# Patient Record
Sex: MALE | Race: OTHER | HISPANIC OR LATINO | URBAN - METROPOLITAN AREA
[De-identification: names, ages, dates, MRNs, and addresses within clinical notes are randomized per-mention and may not be internally consistent; named-entity substitution may affect disease eponyms.]

---

## 2019-11-02 ENCOUNTER — EMERGENCY (EMERGENCY)
Facility: HOSPITAL | Age: 39
LOS: 1 days | Discharge: HOME | End: 2019-11-02
Attending: EMERGENCY MEDICINE
Payer: SELF-PAY

## 2019-11-02 VITALS
WEIGHT: 259.93 LBS | DIASTOLIC BLOOD PRESSURE: 98 MMHG | RESPIRATION RATE: 98 BRPM | SYSTOLIC BLOOD PRESSURE: 172 MMHG | HEIGHT: 66 IN | TEMPERATURE: 98 F | OXYGEN SATURATION: 98 % | HEART RATE: 97 BPM

## 2019-11-02 PROCEDURE — 99284 EMERGENCY DEPT VISIT MOD MDM: CPT

## 2019-11-02 PROCEDURE — 72131 CT LUMBAR SPINE W/O DYE: CPT

## 2019-11-02 PROCEDURE — 99284 EMERGENCY DEPT VISIT MOD MDM: CPT | Mod: 25

## 2019-11-02 PROCEDURE — 96372 THER/PROPH/DIAG INJ SC/IM: CPT

## 2019-11-02 RX ORDER — IBUPROFEN 200 MG
600 TABLET ORAL ONCE
Refills: 0 | Status: COMPLETED | OUTPATIENT
Start: 2019-11-02 | End: 2019-11-02

## 2019-11-02 RX ORDER — LIDOCAINE 4 G/100G
1 CREAM TOPICAL ONCE
Refills: 0 | Status: COMPLETED | OUTPATIENT
Start: 2019-11-02 | End: 2019-11-02

## 2019-11-02 RX ORDER — MORPHINE SULFATE 50 MG/1
4 CAPSULE, EXTENDED RELEASE ORAL ONCE
Refills: 0 | Status: DISCONTINUED | OUTPATIENT
Start: 2019-11-02 | End: 2019-11-02

## 2019-11-02 RX ORDER — METHOCARBAMOL 500 MG/1
1000 TABLET, FILM COATED ORAL ONCE
Refills: 0 | Status: COMPLETED | OUTPATIENT
Start: 2019-11-02 | End: 2019-11-02

## 2019-11-02 RX ORDER — OXYCODONE AND ACETAMINOPHEN 5; 325 MG/1; MG/1
1 TABLET ORAL ONCE
Refills: 0 | Status: DISCONTINUED | OUTPATIENT
Start: 2019-11-02 | End: 2019-11-02

## 2019-11-02 RX ADMIN — METHOCARBAMOL 1000 MILLIGRAM(S): 500 TABLET, FILM COATED ORAL at 19:56

## 2019-11-02 RX ADMIN — MORPHINE SULFATE 4 MILLIGRAM(S): 50 CAPSULE, EXTENDED RELEASE ORAL at 19:56

## 2019-11-02 RX ADMIN — LIDOCAINE 1 PATCH: 4 CREAM TOPICAL at 19:59

## 2019-11-02 RX ADMIN — Medication 600 MILLIGRAM(S): at 19:55

## 2019-11-02 RX ADMIN — OXYCODONE AND ACETAMINOPHEN 1 TABLET(S): 5; 325 TABLET ORAL at 21:22

## 2019-11-02 NOTE — ED STATDOCS - CLINICAL SUMMARY MEDICAL DECISION MAKING FREE TEXT BOX
Pt with back and LLE pain; will give muscle relaxers, antiinflammatories, Lidoderm patch, and morphine for pain; will reeval after X-Ray

## 2019-11-02 NOTE — ED STATDOCS - PATIENT PORTAL LINK FT
You can access the FollowMyHealth Patient Portal offered by [facility] by registering at the following website: [email or web address]. By joining [employer]’s FollowMyHealth portal, you will also be able to view your health information using other applications (apps) compatible with our system.

## 2019-11-02 NOTE — ED STATDOCS - PROGRESS NOTE DETAILS
39 year old male with no PMHx presents to the ED for back pain s/p lifting heavy object. Pt initially did not have pain but then developed pain several hours after the injury. No bowl or bladder incontinence, fever, chills, recent surgeries, h/o CA, weakness of the legs. Pt able to ambulate but with pain. HPI/ ROS/ PEx as stated above. CT reveals chronic changes, no acute findings. CT report printed out and given to pt. Pt to f/u with Spine center and PMD. Pt dced with Robaxin. Return precautions provided.

## 2019-11-02 NOTE — ED STATDOCS - OBJECTIVE STATEMENT
40 y/o M with no significant PMHx presents to the ED c/o back pain s/p an injury at his home. Pt was attempting to lift a heavy object which he estimates to be about 200 lbs when he felt a "pop" in his back. Pt reports that the pain he is experiencing is very severe and radiates into his LLE. Pt states it is difficult to walk due to the pain in his LLE. Pt denies any numbness, tingling, or incontinence of stool or urine.

## 2019-11-02 NOTE — ED STATDOCS - MUSCULOSKELETAL, MLM
No ttp along spinal processes of T or C, positive ttp to sacral spine with no step off or deformities. Extremities have full ROM, are NVI, and no decreased sensation

## 2019-11-02 NOTE — ED ADULT TRIAGE NOTE - CHIEF COMPLAINT QUOTE
Patient arrived to ED today with c/o back pains for the past 4 days after picking up something heavy at work.

## 2019-11-02 NOTE — ED STATDOCS - ATTENDING CONTRIBUTION TO CARE
I, Lilli De Jesus, performed the initial face to face bedside interview with this patient regarding history of present illness, review of symptoms and relevant past medical, social and family history.  I completed an independent physical examination.  I was the initial provider who evaluated this patient. I have signed out the follow up of any pending tests (i.e. labs, radiological studies) to the ACP.  I have communicated the patient’s plan of care and disposition with the ACP.  The history, relevant review of systems, past medical and surgical history, medical decision making, and physical examination was documented by the scribe in my presence and I attest to the accuracy of the documentation.

## 2019-11-03 PROCEDURE — 72131 CT LUMBAR SPINE W/O DYE: CPT | Mod: 26

## 2019-11-03 RX ORDER — METHOCARBAMOL 500 MG/1
1 TABLET, FILM COATED ORAL
Qty: 12 | Refills: 0
Start: 2019-11-03 | End: 2019-11-06

## 2019-11-04 PROBLEM — Z78.9 OTHER SPECIFIED HEALTH STATUS: Chronic | Status: ACTIVE | Noted: 2019-11-03

## 2019-11-04 PROBLEM — Z00.00 ENCOUNTER FOR PREVENTIVE HEALTH EXAMINATION: Status: ACTIVE | Noted: 2019-11-04

## 2019-11-06 ENCOUNTER — APPOINTMENT (OUTPATIENT)
Dept: NEUROSURGERY | Facility: CLINIC | Age: 39
End: 2019-11-06

## 2019-11-06 VITALS
TEMPERATURE: 98 F | HEIGHT: 72 IN | WEIGHT: 270 LBS | DIASTOLIC BLOOD PRESSURE: 85 MMHG | SYSTOLIC BLOOD PRESSURE: 154 MMHG | HEART RATE: 112 BPM | BODY MASS INDEX: 36.57 KG/M2

## 2022-08-25 ENCOUNTER — EMERGENCY (EMERGENCY)
Facility: HOSPITAL | Age: 42
LOS: 1 days | Discharge: HOME | End: 2022-08-25
Attending: EMERGENCY MEDICINE
Payer: COMMERCIAL

## 2022-08-25 VITALS
WEIGHT: 315 LBS | TEMPERATURE: 98 F | HEIGHT: 66 IN | SYSTOLIC BLOOD PRESSURE: 123 MMHG | RESPIRATION RATE: 16 BRPM | OXYGEN SATURATION: 95 % | DIASTOLIC BLOOD PRESSURE: 79 MMHG | HEART RATE: 98 BPM

## 2022-08-25 LAB
ALBUMIN SERPL ELPH-MCNC: 3.7 G/DL — SIGNIFICANT CHANGE UP (ref 3.3–5.2)
ALP SERPL-CCNC: 61 U/L — SIGNIFICANT CHANGE UP (ref 40–120)
ALT FLD-CCNC: 25 U/L — SIGNIFICANT CHANGE UP
ANION GAP SERPL CALC-SCNC: 10 MMOL/L — SIGNIFICANT CHANGE UP (ref 5–17)
AST SERPL-CCNC: 44 U/L — HIGH
BASOPHILS # BLD AUTO: 0.08 K/UL — SIGNIFICANT CHANGE UP (ref 0–0.2)
BASOPHILS NFR BLD AUTO: 1 % — SIGNIFICANT CHANGE UP (ref 0–2)
BILIRUB SERPL-MCNC: 0.2 MG/DL — LOW (ref 0.4–2)
BUN SERPL-MCNC: 7.3 MG/DL — LOW (ref 8–20)
CALCIUM SERPL-MCNC: 8.8 MG/DL — SIGNIFICANT CHANGE UP (ref 8.4–10.5)
CHLORIDE SERPL-SCNC: 98 MMOL/L — SIGNIFICANT CHANGE UP (ref 98–107)
CO2 SERPL-SCNC: 29 MMOL/L — SIGNIFICANT CHANGE UP (ref 22–29)
CREAT SERPL-MCNC: 0.55 MG/DL — SIGNIFICANT CHANGE UP (ref 0.5–1.3)
CRP SERPL-MCNC: 139 MG/L — HIGH
EGFR: 128 ML/MIN/1.73M2 — SIGNIFICANT CHANGE UP
EOSINOPHIL # BLD AUTO: 0.19 K/UL — SIGNIFICANT CHANGE UP (ref 0–0.5)
EOSINOPHIL NFR BLD AUTO: 2.3 % — SIGNIFICANT CHANGE UP (ref 0–6)
ERYTHROCYTE [SEDIMENTATION RATE] IN BLOOD: 5 MM/HR — SIGNIFICANT CHANGE UP (ref 0–20)
GLUCOSE SERPL-MCNC: 159 MG/DL — HIGH (ref 70–99)
HCT VFR BLD CALC: 50.5 % — HIGH (ref 39–50)
HGB BLD-MCNC: 16.5 G/DL — SIGNIFICANT CHANGE UP (ref 13–17)
IMM GRANULOCYTES NFR BLD AUTO: 0.5 % — SIGNIFICANT CHANGE UP (ref 0–1.5)
LYMPHOCYTES # BLD AUTO: 1.93 K/UL — SIGNIFICANT CHANGE UP (ref 1–3.3)
LYMPHOCYTES # BLD AUTO: 23 % — SIGNIFICANT CHANGE UP (ref 13–44)
MCHC RBC-ENTMCNC: 31.3 PG — SIGNIFICANT CHANGE UP (ref 27–34)
MCHC RBC-ENTMCNC: 32.7 GM/DL — SIGNIFICANT CHANGE UP (ref 32–36)
MCV RBC AUTO: 95.8 FL — SIGNIFICANT CHANGE UP (ref 80–100)
MONOCYTES # BLD AUTO: 1.31 K/UL — HIGH (ref 0–0.9)
MONOCYTES NFR BLD AUTO: 15.6 % — HIGH (ref 2–14)
NEUTROPHILS # BLD AUTO: 4.84 K/UL — SIGNIFICANT CHANGE UP (ref 1.8–7.4)
NEUTROPHILS NFR BLD AUTO: 57.6 % — SIGNIFICANT CHANGE UP (ref 43–77)
NT-PROBNP SERPL-SCNC: 97 PG/ML — SIGNIFICANT CHANGE UP (ref 0–300)
PLATELET # BLD AUTO: 205 K/UL — SIGNIFICANT CHANGE UP (ref 150–400)
POTASSIUM SERPL-MCNC: 5.4 MMOL/L — HIGH (ref 3.5–5.3)
POTASSIUM SERPL-SCNC: 5.4 MMOL/L — HIGH (ref 3.5–5.3)
PROT SERPL-MCNC: 7.7 G/DL — SIGNIFICANT CHANGE UP (ref 6.6–8.7)
RBC # BLD: 5.27 M/UL — SIGNIFICANT CHANGE UP (ref 4.2–5.8)
RBC # FLD: 13.9 % — SIGNIFICANT CHANGE UP (ref 10.3–14.5)
SODIUM SERPL-SCNC: 137 MMOL/L — SIGNIFICANT CHANGE UP (ref 135–145)
TROPONIN T SERPL-MCNC: <0.01 NG/ML — SIGNIFICANT CHANGE UP (ref 0–0.06)
WBC # BLD: 8.39 K/UL — SIGNIFICANT CHANGE UP (ref 3.8–10.5)
WBC # FLD AUTO: 8.39 K/UL — SIGNIFICANT CHANGE UP (ref 3.8–10.5)

## 2022-08-25 PROCEDURE — 87040 BLOOD CULTURE FOR BACTERIA: CPT

## 2022-08-25 PROCEDURE — 83880 ASSAY OF NATRIURETIC PEPTIDE: CPT

## 2022-08-25 PROCEDURE — 93970 EXTREMITY STUDY: CPT | Mod: 26

## 2022-08-25 PROCEDURE — 99285 EMERGENCY DEPT VISIT HI MDM: CPT

## 2022-08-25 PROCEDURE — 96375 TX/PRO/DX INJ NEW DRUG ADDON: CPT

## 2022-08-25 PROCEDURE — 93005 ELECTROCARDIOGRAM TRACING: CPT

## 2022-08-25 PROCEDURE — 84484 ASSAY OF TROPONIN QUANT: CPT

## 2022-08-25 PROCEDURE — 80053 COMPREHEN METABOLIC PANEL: CPT

## 2022-08-25 PROCEDURE — 71046 X-RAY EXAM CHEST 2 VIEWS: CPT | Mod: 26

## 2022-08-25 PROCEDURE — 85025 COMPLETE CBC W/AUTO DIFF WBC: CPT

## 2022-08-25 PROCEDURE — 71046 X-RAY EXAM CHEST 2 VIEWS: CPT

## 2022-08-25 PROCEDURE — 86140 C-REACTIVE PROTEIN: CPT

## 2022-08-25 PROCEDURE — 93010 ELECTROCARDIOGRAM REPORT: CPT

## 2022-08-25 PROCEDURE — 96374 THER/PROPH/DIAG INJ IV PUSH: CPT

## 2022-08-25 PROCEDURE — 85652 RBC SED RATE AUTOMATED: CPT

## 2022-08-25 PROCEDURE — 93970 EXTREMITY STUDY: CPT

## 2022-08-25 PROCEDURE — 99285 EMERGENCY DEPT VISIT HI MDM: CPT | Mod: 25

## 2022-08-25 PROCEDURE — 36415 COLL VENOUS BLD VENIPUNCTURE: CPT

## 2022-08-25 RX ORDER — AMPICILLIN SODIUM AND SULBACTAM SODIUM 250; 125 MG/ML; MG/ML
3 INJECTION, POWDER, FOR SUSPENSION INTRAMUSCULAR; INTRAVENOUS ONCE
Refills: 0 | Status: COMPLETED | OUTPATIENT
Start: 2022-08-25 | End: 2022-08-25

## 2022-08-25 RX ORDER — CEPHALEXIN 500 MG
1 CAPSULE ORAL
Qty: 40 | Refills: 0
Start: 2022-08-25 | End: 2022-09-03

## 2022-08-25 RX ORDER — FUROSEMIDE 40 MG
40 TABLET ORAL ONCE
Refills: 0 | Status: COMPLETED | OUTPATIENT
Start: 2022-08-25 | End: 2022-08-25

## 2022-08-25 RX ADMIN — AMPICILLIN SODIUM AND SULBACTAM SODIUM 200 GRAM(S): 250; 125 INJECTION, POWDER, FOR SUSPENSION INTRAMUSCULAR; INTRAVENOUS at 18:48

## 2022-08-25 RX ADMIN — Medication 40 MILLIGRAM(S): at 18:47

## 2022-08-25 NOTE — ED STATDOCS - ATTENDING APP SHARED VISIT CONTRIBUTION OF CARE
I, Gail Herrera, performed the initial face to face bedside interview with this patient regarding history of present illness, review of symptoms and relevant past medical, social and family history.  I completed an independent physical examination.  I was the initial provider who evaluated this patient. I have signed out the follow up of any pending tests (i.e. labs, radiological studies) to the ACP.  I have communicated the patient’s plan of care and disposition with the ACP.

## 2022-08-25 NOTE — ED STATDOCS - PRINCIPAL DIAGNOSIS
Anesthesia Pre Eval Note    Anesthesia ROS/Med Hx        Anesthetic Complication History:  Patient does not have a history of anesthetic complications      Pulmonary Review:  Patient does not have a pulmonary history      Neuro/Psych Review:  Comments: glaucoma       Cardiovascular Review:  Comments: Carotid stenosis    Positive for hypertension  Positive for hyperlipidemia    GI/HEPATIC/RENAL Review:  Patient does not have a GI/hepatic/renalhistory       End/Other Review:  Patient does not have an endo/other history    Additional Results:     ALLERGIES:   -- Eye Drops -- Other (See Comments)    --  Allergan eye drops             Redness   -- Penicillin G -- RASH       Lab Results       Component                Value               Date                       WBC                      11.6 (H)            02/02/2020                 RBC                      3.40 (L)            02/02/2020                 HGB                      9.9 (L)             02/02/2020                 HCT                      31.5 (L)            02/02/2020                 MCHC                     31.4 (L)            02/02/2020                 SODIUM                   137                 02/01/2020                 POTASSIUM                3.6                 02/01/2020                 CHLORIDE                 106                 02/01/2020                 CO2                      28                  02/01/2020                 GLUCOSE                  121 (H)             02/01/2020                 BUN                      9                   02/01/2020                 CREATININE               0.54                02/01/2020                 GFRA                     >90                 02/01/2020                 GFRNA                    89                  02/01/2020                 CALCIUM                  7.4 (L)             02/01/2020                 PLT                      200                 02/02/2020                 PTT                      34  (H)              02/02/2020                 INR                      1.4                 02/02/2020             Past Medical History:  No date: Anxiety  No date: Carotid stenosis      Comment:  no surgery   No date: Essential (primary) hypertension  No date: Glaucoma  No date: High cholesterol  2019: Malignant neoplasm (CMS/HCC)      Comment:  face, back   No date: Osteoarthritis  No date: PVC's (premature ventricular contractions)    Past Surgical History:  01/31/2020: Joint replacement; Right      Comment:  right hip replacement  No date: Pelvic floor repair      Comment:  with mesh   2019: Skin cancer excision      Comment:  tip of nose & back   No date: Tonsillectomy       Prior to Admission medications :  Medication aspirin 81 MG EC tablet, Sig Take 81 mg by mouth daily., Start Date , End Date , Taking? Yes, Authorizing Provider Outside Provider    Medication Calcium Carbonate Antacid (CALCIUM CARBONATE PO), Sig Take 300 mg by mouth 2 times daily., Start Date , End Date , Taking? Yes, Authorizing Provider Outside Provider    Medication sertraline (ZOLOFT) 50 MG tablet, Sig Take 50 mg by mouth daily. Indications: Generalized Anxiety Disorder, Start Date , End Date , Taking? Yes, Authorizing Provider Outside Provider    Medication GLUCOSAMINE SULFATE PO, Sig Take 500 mg by mouth daily., Start Date , End Date , Taking? Yes, Authorizing Provider Outside Provider    Medication Vitamin D, Ergocalciferol, 50 mcg (2,000 units) capsule, Sig Take 2,000 Units by mouth daily. , Start Date , End Date , Taking? Yes, Authorizing Provider Outside Provider    Medication timolol (TIMOPTIC) 0.5 % ophthalmic solution, Sig Place 1 drop into both eyes daily., Start Date , End Date , Taking? Yes, Authorizing Provider Outside Provider    Medication alendronate (FOSAMAX) 70 MG tablet, Sig Take 70 mg by mouth every 7 days. Indications: Osteoporosis Every Friday, Start Date 9/7/19, End Date , Taking? Yes, Authorizing Provider Outside  Provider    Medication atorvastatin (LIPITOR) 20 MG tablet, Sig Take 20 mg by mouth daily. , Start Date 9/3/19, End Date , Taking? Yes, Authorizing Provider Outside Provider    Medication dilTIAZem (CARDIZEM CD) 120 MG 24 hr capsule, Sig Take 120 mg by mouth daily. , Start Date 9/16/19, End Date , Taking? Yes, Authorizing Provider Outside Provider    Medication estradiol (ESTRACE) 0.1 MG/GM vaginal cream, Sig Place vaginally 2 days a week. Indications: 1 cm twice a week Every Monday and Friday, Start Date 10/8/19, End Date , Taking? Yes, Authorizing Provider Outside Provider    Medication flecainide (TAMBOCOR) 100 MG tablet, Sig Take 50 mg by mouth 2 times daily. , Start Date 9/23/19, End Date , Taking? Yes, Authorizing Provider Outside Provider    Medication valsartan-hydroCHLOROthiazide (DIOVAN-HCT) 160-12.5 MG per tablet, Sig Take 0.5 tablets by mouth daily. Indications: High Blood Pressure Disorder , Start Date 8/20/19, End Date , Taking? Yes, Authorizing Provider Outside Provider            Relevant Problems   No relevant active problems       Physical Exam     Airway   Mallampati: II  TM Distance: >3 FB  Neck ROM: Full  Neck: Non-tender and Able to place in sniff position  TMJ Mobility: Good    Cardiovascular  Cardiovascular exam normal  Cardio Rhythm: Regular  Cardio Rate: Normal    Head Assessment  Head assessment: Normocephalic and Atraumatic    General Assessment  General Assessment: Alert and oriented and No acute distress    Dental Exam    Patient has:  Poor dentition    Pulmonary Exam  Pulmonary exam normal  Breath sounds clear to auscultation:  Yes    Abdominal Exam  Abdominal exam normal      Anesthesia Plan    ASA Status: 3    Anesthesia Type: Spinal        Risks Discussed: Nausea, Vomiting, Headache, Hypotension, Nerve Injury, Allergic Reaction, Sore Throat, Dental Injury, Post-op Intubation, Aspiration and Intra-operative Awareness    Post-op Pain Management: Per Surgeon      Checklist  Reviewed:  NPO Status, Allergies, Medications, Problem list, Past Med History, Patient Summary and Lab Results    Informed Consent  The proposed anesthetic plan, including its risks and benefits, have been discussed with the Patient  - along with the risks and benefits of alternatives.  Questions were encouraged and answered and the patient and/or representative understands and agrees to proceed.     Blood Products: Not Anticipated     Cellulitis of left lower extremity

## 2022-08-25 NOTE — ED ADULT NURSE NOTE - INTERVENTIONS DEFINITIONS
Physically safe environment: no spills, clutter or unnecessary equipment/Stretcher in lowest position, wheels locked, appropriate side rails in place/Monitor gait and stability/Monitor for mental status changes and reorient to person, place, and time/Reinforce activity limits and safety measures with patient and family

## 2022-08-25 NOTE — ED STATDOCS - PATIENT PORTAL LINK FT
You can access the FollowMyHealth Patient Portal offered by [facility] by registering at the following website: [email or web address]. By joining [employer]’s FollowMyHealth portal, you will also be able to view your health information using other applications (apps) compatible with our system.

## 2022-08-25 NOTE — ED STATDOCS - CLINICAL SUMMARY MEDICAL DECISION MAKING FREE TEXT BOX
chronic leg swelling; now with possibly superimposed celluliits will fu labs sono to ro dvt pt will need pmd and vascular surgery follow up

## 2022-08-25 NOTE — ED STATDOCS - NSFOLLOWUPINSTRUCTIONS_ED_ALL_ED_FT
Continue with Keflex as prescribed   Followup with [facility]   Followup with vascular clinic

## 2022-08-25 NOTE — ED STATDOCS - PHYSICAL EXAMINATION
Head: atraumatic, normacephalic  Face: atraumatic, no crepitus no orbiral/maxillary/mandibular ttp  throat: uvula midline no exudates  eyes: perrla eomi  heart: rrr s1s2  lungs: ctab  abd: soft, nt nd +bs no rebound/guarding no cva ttp  skin: warm  LE: BL LE SWELLING ; + redness to left le + increased warmth neurovasculalry intact cap refill <2sec  back: no midline cervical/thoracic/lumbar ttp

## 2022-08-25 NOTE — ED STATDOCS - NS ED ATTENDING STATEMENT MOD
This was a shared visit with the MURPHY. I reviewed and verified the documentation and independently performed the documented:

## 2022-08-25 NOTE — ED STATDOCS - OBJECTIVE STATEMENT
41yo M hx of herniated disk sp back sx 3 years ago pw worsening bl leg swelling l>r. notes worsening swelling x 4 days with fever on tue and wed now resolved. notes that swelling started to happen sp his back surgery but also stands a lot at work so that makes it worse. usually gets better when he rests. + redness to left le x 4 days. denies /n/v/cp/sob/palpitations/ cough/rash/headache/dizziness/abd.pain/d/c/dysuria/hematuria. no sick contacts no recent travel. has not followed up with pmd or vascular for this has been just dealing with this at home on his own.

## 2022-08-25 NOTE — ED STATDOCS - NSFOLLOWUPCLINICS_GEN_ALL_ED_FT
[id number] [place] [address]  Phone: [phone]  Fax:   Follow Up Time: Routine    [facility] Vascular Surgery  Vascular Surgery  [address] [place] [address]  Phone: [phone]  Fax:   Follow Up Time: Routine

## 2022-08-25 NOTE — ED STATDOCS - NS ED ROS FT
Denies n/v/cp/sob/palpitations/ cough/rash/headache/dizziness/abd.pain/d/c/dysuria/hematuria  +BL LE SWELLING And redness to left leg

## 2022-08-25 NOTE — ED STATDOCS - PROGRESS NOTE DETAILS
Pt moved form intake Room. Pt seen and evaluated by intake Physician. HPI, Physical examination performed by intake Physician . Note reviewed and followup examination performed by me consistent with initial assessment. Agrees with intake Physician plan and tests. US negative for DVT.  Pt made aware of his US findings and will be placed on keflex . Pt Labs within acceptable limits and F/U for [facility] and vascular Clinic. Pt D/C in stable condition with Rx and F/U information provided.

## 2022-08-31 LAB
CULTURE RESULTS: SIGNIFICANT CHANGE UP
CULTURE RESULTS: SIGNIFICANT CHANGE UP
SPECIMEN SOURCE: SIGNIFICANT CHANGE UP
SPECIMEN SOURCE: SIGNIFICANT CHANGE UP

## 2022-09-06 ENCOUNTER — APPOINTMENT (OUTPATIENT)
Dept: VASCULAR SURGERY | Facility: CLINIC | Age: 42
End: 2022-09-06

## 2022-09-06 VITALS
OXYGEN SATURATION: 98 % | TEMPERATURE: 98.7 F | HEIGHT: 72 IN | DIASTOLIC BLOOD PRESSURE: 86 MMHG | SYSTOLIC BLOOD PRESSURE: 131 MMHG | WEIGHT: 315 LBS | BODY MASS INDEX: 42.66 KG/M2 | HEART RATE: 85 BPM

## 2022-09-06 PROCEDURE — 99202 OFFICE O/P NEW SF 15 MIN: CPT

## 2022-09-06 PROCEDURE — 93970 EXTREMITY STUDY: CPT

## 2022-09-06 NOTE — PHYSICAL EXAM
[JVD] : no jugular venous distention  [Normal Breath Sounds] : Normal breath sounds [Normal Heart Sounds] : normal heart sounds [Normal Rate and Rhythm] : normal rate and rhythm [2+] : left 2+ [Ankle Swelling (On Exam)] : present [Ankle Swelling Bilaterally] : bilaterally  [Varicose Veins Of Lower Extremities] : not present [] : bilaterally [Ankle Swelling On The Left] : moderate [Skin Ulcer] : no ulcer [Alert] : alert [Oriented to Person] : oriented to person [Oriented to Place] : oriented to place [Oriented to Time] : oriented to time [Calm] : calm [de-identified] : obese, well appearing male ambulates unassisted [de-identified] : wnl [FreeTextEntry1] : B/L: appearance of chronic lymphedema with thickened skind, hyperpigmentation, edema to toes, boxed toes; no cellulitis, no ulcers

## 2022-09-06 NOTE — ASSESSMENT
[FreeTextEntry1] : 41yo male now resolved cellulitis with b/l lower extremity lymphedema; patient has never previously tried compression\par -Recommend 20-30mmHg compression stockings to knee; explained to be worn from morning until evening, taken off before bed\par -Recommend leg elevation in the evening\par -Discussed weight loss and nutrition improvement as this will help with lower extremity edema\par -follow up in 3 months

## 2022-09-06 NOTE — REASON FOR VISIT
[Consultation] : a consultation visit [[employer] ] : provided by [employer]   [FreeTextEntry1] : lower extremity swelling  [Interpreters_FullName] : Parrish [TWNoteComboBox1] : Sloven[place]

## 2022-09-06 NOTE — HISTORY OF PRESENT ILLNESS
[FreeTextEntry1] : 43yo obese male PMH HTN, HLD presents with b/l lower extremity swelling. Patient was seen in the ED about 8 days ago with episode of lower extremity cellulitis, given course of Keflex. Since that visit he completed antibiotics and feels significantly better. Currently denies fever, chills, systemic symptoms of infection and also denies pain or increased swelling from baseline.\par \par He otherwise complains that for about 2 years (since 2020) has b/l lower extremity leg swelling associated with darkened, thickened skin and itching. He denies any previous wounds. 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 \par \par PMH: HTN, HLD, obesity\par PSH: spine surgery 2020\par Meds: none, tylenol prn\par Allergies NKDA\par SocH: former smoker, occasional EtOH

## 2022-09-06 NOTE — PROCEDURE
[FreeTextEntry1] : Lower Extremity Venous Duplex\par Pelvic vessels no visualized due to morbid obesity\par B/L GSV without reflux\par B/L small tributaries in thighs\par B/L pulsatile venous flow

## 2022-10-17 ENCOUNTER — APPOINTMENT (OUTPATIENT)
Dept: VASCULAR SURGERY | Facility: CLINIC | Age: 42
End: 2022-10-17

## 2022-12-13 ENCOUNTER — APPOINTMENT (OUTPATIENT)
Dept: VASCULAR SURGERY | Facility: CLINIC | Age: 42
End: 2022-12-13

## 2023-02-10 ENCOUNTER — OFFICE (OUTPATIENT)
Dept: URBAN - METROPOLITAN AREA CLINIC 94 | Facility: CLINIC | Age: 43
Setting detail: OPHTHALMOLOGY
End: 2023-02-10
Payer: COMMERCIAL

## 2023-02-10 DIAGNOSIS — H04.122: ICD-10-CM

## 2023-02-10 DIAGNOSIS — H11.042: ICD-10-CM

## 2023-02-10 DIAGNOSIS — H04.121: ICD-10-CM

## 2023-02-10 PROCEDURE — 92004 COMPRE OPH EXAM NEW PT 1/>: CPT | Performed by: OPHTHALMOLOGY

## 2023-02-10 PROCEDURE — 83861 MICROFLUID ANALY TEARS: CPT | Performed by: OPHTHALMOLOGY

## 2023-02-10 PROCEDURE — 92285 EXTERNAL OCULAR PHOTOGRAPHY: CPT | Performed by: OPHTHALMOLOGY

## 2023-02-10 ASSESSMENT — VISUAL ACUITY
OD_BCVA: 20/25
OS_BCVA: 20/20

## 2023-02-10 ASSESSMENT — CORNEAL PTERYGIUM: OS_PTERYGIUM: NASAL 3MM

## 2023-02-10 ASSESSMENT — TONOMETRY
OS_IOP_MMHG: 20
OD_IOP_MMHG: 19

## 2023-04-03 ENCOUNTER — ASC (OUTPATIENT)
Dept: URBAN - METROPOLITAN AREA SURGERY 8 | Facility: SURGERY | Age: 43
Setting detail: OPHTHALMOLOGY
End: 2023-04-03
Payer: COMMERCIAL

## 2023-04-03 DIAGNOSIS — H11.042: ICD-10-CM

## 2023-04-03 PROCEDURE — 65426 REMOVAL OF EYE LESION: CPT | Performed by: OPHTHALMOLOGY

## 2023-04-04 ENCOUNTER — RX ONLY (RX ONLY)
Age: 43
End: 2023-04-04

## 2023-04-04 ENCOUNTER — OFFICE (OUTPATIENT)
Dept: URBAN - METROPOLITAN AREA CLINIC 116 | Facility: CLINIC | Age: 43
Setting detail: OPHTHALMOLOGY
End: 2023-04-04
Payer: COMMERCIAL

## 2023-04-04 DIAGNOSIS — H11.042: ICD-10-CM

## 2023-04-04 PROCEDURE — 99024 POSTOP FOLLOW-UP VISIT: CPT | Performed by: PHYSICIAN ASSISTANT

## 2023-04-04 ASSESSMENT — SPHEQUIV_DERIVED
OD_SPHEQUIV: -0.125
OS_SPHEQUIV: -0.875

## 2023-04-04 ASSESSMENT — AXIALLENGTH_DERIVED
OD_AL: 24.2072
OS_AL: 24.1765

## 2023-04-04 ASSESSMENT — TONOMETRY
OD_IOP_MMHG: 19
OS_IOP_MMHG: 19

## 2023-04-04 ASSESSMENT — REFRACTION_AUTOREFRACTION
OD_AXIS: 070
OS_SPHERE: -0.25
OS_CYLINDER: -1.25
OD_SPHERE: 0.00
OS_AXIS: 095
OD_CYLINDER: -0.25

## 2023-04-04 ASSESSMENT — VISUAL ACUITY
OS_BCVA: 20/25-
OD_BCVA: 20/30-

## 2023-04-04 ASSESSMENT — KERATOMETRY
OD_K2POWER_DIOPTERS: 42.25
OS_K1POWER_DIOPTERS: 42.25
OS_K2POWER_DIOPTERS: 43.50
OD_AXISANGLE_DEGREES: 105
OD_K1POWER_DIOPTERS: 41.75
OS_AXISANGLE_DEGREES: 095

## 2023-04-04 ASSESSMENT — CONFRONTATIONAL VISUAL FIELD TEST (CVF)
OD_FINDINGS: FULL
OS_FINDINGS: FULL

## 2023-04-18 ENCOUNTER — OFFICE (OUTPATIENT)
Dept: URBAN - METROPOLITAN AREA CLINIC 94 | Facility: CLINIC | Age: 43
Setting detail: OPHTHALMOLOGY
End: 2023-04-18
Payer: COMMERCIAL

## 2023-04-18 DIAGNOSIS — H11.042: ICD-10-CM

## 2023-04-18 DIAGNOSIS — H11.32: ICD-10-CM

## 2023-04-18 PROCEDURE — 99024 POSTOP FOLLOW-UP VISIT: CPT | Performed by: OPHTHALMOLOGY

## 2023-04-18 ASSESSMENT — CONFRONTATIONAL VISUAL FIELD TEST (CVF)
OD_FINDINGS: FULL
OS_FINDINGS: FULL

## 2023-04-18 ASSESSMENT — REFRACTION_AUTOREFRACTION
OS_SPHERE: -0.25
OD_SPHERE: 0.00
OS_CYLINDER: -1.25
OD_AXIS: 070
OS_AXIS: 095
OD_CYLINDER: -0.25

## 2023-04-18 ASSESSMENT — TONOMETRY
OS_IOP_MMHG: 18
OD_IOP_MMHG: 18

## 2023-04-18 ASSESSMENT — AXIALLENGTH_DERIVED
OS_AL: 24.1765
OD_AL: 24.2072

## 2023-04-18 ASSESSMENT — VISUAL ACUITY
OS_BCVA: 20/25-
OD_BCVA: 20/20-1

## 2023-04-18 ASSESSMENT — SPHEQUIV_DERIVED
OD_SPHEQUIV: -0.125
OS_SPHEQUIV: -0.875

## 2023-04-18 ASSESSMENT — KERATOMETRY
OS_AXISANGLE_DEGREES: 095
OD_K1POWER_DIOPTERS: 41.75
OD_K2POWER_DIOPTERS: 42.25
OS_K1POWER_DIOPTERS: 42.25
OD_AXISANGLE_DEGREES: 105
OS_K2POWER_DIOPTERS: 43.50

## 2023-05-02 ENCOUNTER — OFFICE (OUTPATIENT)
Dept: URBAN - METROPOLITAN AREA CLINIC 94 | Facility: CLINIC | Age: 43
Setting detail: OPHTHALMOLOGY
End: 2023-05-02
Payer: COMMERCIAL

## 2023-05-02 DIAGNOSIS — H11.32: ICD-10-CM

## 2023-05-02 DIAGNOSIS — H11.042: ICD-10-CM

## 2023-05-02 PROCEDURE — 99024 POSTOP FOLLOW-UP VISIT: CPT | Performed by: OPHTHALMOLOGY

## 2023-05-02 ASSESSMENT — REFRACTION_AUTOREFRACTION
OS_CYLINDER: -1.25
OS_SPHERE: -0.25
OD_AXIS: 070
OD_SPHERE: 0.00
OD_CYLINDER: -0.25
OS_AXIS: 095

## 2023-05-02 ASSESSMENT — KERATOMETRY
OS_AXISANGLE_DEGREES: 095
OD_K1POWER_DIOPTERS: 41.75
OD_K2POWER_DIOPTERS: 42.25
OS_K2POWER_DIOPTERS: 43.50
OD_AXISANGLE_DEGREES: 105
OS_K1POWER_DIOPTERS: 42.25

## 2023-05-02 ASSESSMENT — VISUAL ACUITY
OD_BCVA: 20/20
OS_BCVA: 20/20

## 2023-05-02 ASSESSMENT — TONOMETRY
OS_IOP_MMHG: 20
OD_IOP_MMHG: 19

## 2023-05-02 ASSESSMENT — SPHEQUIV_DERIVED
OS_SPHEQUIV: -0.875
OD_SPHEQUIV: -0.125

## 2023-05-02 ASSESSMENT — AXIALLENGTH_DERIVED
OS_AL: 24.1765
OD_AL: 24.2072

## 2023-05-02 ASSESSMENT — CONFRONTATIONAL VISUAL FIELD TEST (CVF)
OS_FINDINGS: FULL
OD_FINDINGS: FULL

## 2024-02-18 ENCOUNTER — EMERGENCY (EMERGENCY)
Facility: HOSPITAL | Age: 44
LOS: 1 days | Discharge: HOME | End: 2024-02-18
Attending: EMERGENCY MEDICINE
Payer: COMMERCIAL

## 2024-02-18 VITALS
TEMPERATURE: 99 F | SYSTOLIC BLOOD PRESSURE: 100 MMHG | HEART RATE: 100 BPM | OXYGEN SATURATION: 98 % | WEIGHT: 286.16 LBS | DIASTOLIC BLOOD PRESSURE: 66 MMHG | RESPIRATION RATE: 20 BRPM

## 2024-02-18 LAB
ALBUMIN SERPL ELPH-MCNC: 3.5 G/DL — SIGNIFICANT CHANGE UP (ref 3.3–5.2)
ALP SERPL-CCNC: 64 U/L — SIGNIFICANT CHANGE UP (ref 40–120)
ALT FLD-CCNC: 30 U/L — SIGNIFICANT CHANGE UP
ANION GAP SERPL CALC-SCNC: 13 MMOL/L — SIGNIFICANT CHANGE UP (ref 5–17)
AST SERPL-CCNC: 36 U/L — SIGNIFICANT CHANGE UP
BASOPHILS # BLD AUTO: 0.07 K/UL — SIGNIFICANT CHANGE UP (ref 0–0.2)
BASOPHILS NFR BLD AUTO: 0.6 % — SIGNIFICANT CHANGE UP (ref 0–2)
BILIRUB SERPL-MCNC: 0.4 MG/DL — SIGNIFICANT CHANGE UP (ref 0.4–2)
BUN SERPL-MCNC: 9 MG/DL — SIGNIFICANT CHANGE UP (ref 8–20)
CALCIUM SERPL-MCNC: 8.9 MG/DL — SIGNIFICANT CHANGE UP (ref 8.4–10.5)
CHLORIDE SERPL-SCNC: 98 MMOL/L — SIGNIFICANT CHANGE UP (ref 96–108)
CO2 SERPL-SCNC: 27 MMOL/L — SIGNIFICANT CHANGE UP (ref 22–29)
CREAT SERPL-MCNC: 0.78 MG/DL — SIGNIFICANT CHANGE UP (ref 0.5–1.3)
EGFR: 114 ML/MIN/1.73M2 — SIGNIFICANT CHANGE UP
EOSINOPHIL # BLD AUTO: 0.05 K/UL — SIGNIFICANT CHANGE UP (ref 0–0.5)
EOSINOPHIL NFR BLD AUTO: 0.4 % — SIGNIFICANT CHANGE UP (ref 0–6)
GLUCOSE SERPL-MCNC: 116 MG/DL — HIGH (ref 70–99)
HCT VFR BLD CALC: 49.8 % — SIGNIFICANT CHANGE UP (ref 39–50)
HGB BLD-MCNC: 16.4 G/DL — SIGNIFICANT CHANGE UP (ref 13–17)
IMM GRANULOCYTES NFR BLD AUTO: 0.4 % — SIGNIFICANT CHANGE UP (ref 0–0.9)
LYMPHOCYTES # BLD AUTO: 1.21 K/UL — SIGNIFICANT CHANGE UP (ref 1–3.3)
LYMPHOCYTES # BLD AUTO: 10.8 % — LOW (ref 13–44)
MCHC RBC-ENTMCNC: 31.4 PG — SIGNIFICANT CHANGE UP (ref 27–34)
MCHC RBC-ENTMCNC: 32.9 GM/DL — SIGNIFICANT CHANGE UP (ref 32–36)
MCV RBC AUTO: 95.2 FL — SIGNIFICANT CHANGE UP (ref 80–100)
MONOCYTES # BLD AUTO: 1.36 K/UL — HIGH (ref 0–0.9)
MONOCYTES NFR BLD AUTO: 12.1 % — SIGNIFICANT CHANGE UP (ref 2–14)
NEUTROPHILS # BLD AUTO: 8.51 K/UL — HIGH (ref 1.8–7.4)
NEUTROPHILS NFR BLD AUTO: 75.7 % — SIGNIFICANT CHANGE UP (ref 43–77)
PLATELET # BLD AUTO: 167 K/UL — SIGNIFICANT CHANGE UP (ref 150–400)
POTASSIUM SERPL-MCNC: 4.5 MMOL/L — SIGNIFICANT CHANGE UP (ref 3.5–5.3)
POTASSIUM SERPL-SCNC: 4.5 MMOL/L — SIGNIFICANT CHANGE UP (ref 3.5–5.3)
PROT SERPL-MCNC: 7.1 G/DL — SIGNIFICANT CHANGE UP (ref 6.6–8.7)
RBC # BLD: 5.23 M/UL — SIGNIFICANT CHANGE UP (ref 4.2–5.8)
RBC # FLD: 14.1 % — SIGNIFICANT CHANGE UP (ref 10.3–14.5)
SODIUM SERPL-SCNC: 138 MMOL/L — SIGNIFICANT CHANGE UP (ref 135–145)
WBC # BLD: 11.24 K/UL — HIGH (ref 3.8–10.5)
WBC # FLD AUTO: 11.24 K/UL — HIGH (ref 3.8–10.5)

## 2024-02-18 PROCEDURE — 99285 EMERGENCY DEPT VISIT HI MDM: CPT

## 2024-02-18 PROCEDURE — 93970 EXTREMITY STUDY: CPT | Mod: 26

## 2024-02-18 RX ORDER — CEFAZOLIN SODIUM 1 G
VIAL (EA) INJECTION
Refills: 0 | Status: DISCONTINUED | OUTPATIENT
Start: 2024-02-18 | End: 2024-02-18

## 2024-02-18 RX ORDER — CEFAZOLIN SODIUM 1 G
2000 VIAL (EA) INJECTION EVERY 8 HOURS
Refills: 0 | Status: DISCONTINUED | OUTPATIENT
Start: 2024-02-19 | End: 2024-02-26

## 2024-02-18 RX ORDER — CEFAZOLIN SODIUM 1 G
2000 VIAL (EA) INJECTION ONCE
Refills: 0 | Status: COMPLETED | OUTPATIENT
Start: 2024-02-18 | End: 2024-02-18

## 2024-02-18 RX ORDER — SODIUM CHLORIDE 9 MG/ML
3 INJECTION INTRAMUSCULAR; INTRAVENOUS; SUBCUTANEOUS ONCE
Refills: 0 | Status: COMPLETED | OUTPATIENT
Start: 2024-02-18 | End: 2024-02-18

## 2024-02-18 RX ADMIN — SODIUM CHLORIDE 3 MILLILITER(S): 9 INJECTION INTRAMUSCULAR; INTRAVENOUS; SUBCUTANEOUS at 21:53

## 2024-02-18 RX ADMIN — Medication 2000 MILLIGRAM(S): at 21:53

## 2024-02-18 NOTE — ED PROVIDER NOTE - CLINICAL SUMMARY MEDICAL DECISION MAKING FREE TEXT BOX
44-year-old male morbidly obese with no significant past medical history complaining of left lower extremity pain redness and warmth times the last 3 days.  Patient admits to shaking chills with tactile fever but did not take his temperature.  Patient denies any associated chest pain, shortness of breath, abdominal pain, nausea, vomiting or syncope. patient has been taking Tylenol without relief of pain or fever.   send consistent with acute cellulitis will start antibiotics check labs blood cultures and with send for bilateral lower extremity Dopplers

## 2024-02-18 NOTE — ED PROVIDER NOTE - OBJECTIVE STATEMENT
44-year-old male morbidly obese with no significant past medical history complaining of left lower extremity pain redness and warmth times the last 3 days.  Patient admits to shaking chills with tactile fever but did not take his temperature.  Patient denies any associated chest pain, shortness of breath, abdominal pain, nausea, vomiting or syncope. patient has been taking Tylenol without relief of pain or fever

## 2024-02-18 NOTE — ED PROVIDER NOTE - SKIN, MLM
bilateral lower extremities with skin changes consistent with venous stasis, left lower extremity with increased warmth erythema tenderness to palpation, skin intact,  positive palpable distal pulses

## 2024-02-18 NOTE — ED ADULT NURSE NOTE - CAS ELECT INFOMATION PROVIDED
Patient AA&Ox4, resp even/unlabored, ambulatory, steady gait noted, discharged home with all belongings. Med rec and discharge instructions explained and given to pt by YANELY San. Patient verbalizes understanding on physician follow up, medication regimen and next dose, s/s of complications and monitoring. No distress noted. VSS, recorded in EMR. Peripheral IV removed, intact, bleeding controlled./DC instructions

## 2024-02-19 ENCOUNTER — RESULT REVIEW (OUTPATIENT)
Age: 44
End: 2024-02-19

## 2024-02-19 VITALS
SYSTOLIC BLOOD PRESSURE: 111 MMHG | RESPIRATION RATE: 18 BRPM | TEMPERATURE: 98 F | OXYGEN SATURATION: 94 % | HEART RATE: 84 BPM | DIASTOLIC BLOOD PRESSURE: 70 MMHG

## 2024-02-19 LAB
A1C WITH ESTIMATED AVERAGE GLUCOSE RESULT: 6.3 % — HIGH (ref 4–5.6)
BASOPHILS # BLD AUTO: 0.09 K/UL — SIGNIFICANT CHANGE UP (ref 0–0.2)
BASOPHILS NFR BLD AUTO: 0.9 % — SIGNIFICANT CHANGE UP (ref 0–2)
EOSINOPHIL # BLD AUTO: 0.07 K/UL — SIGNIFICANT CHANGE UP (ref 0–0.5)
EOSINOPHIL NFR BLD AUTO: 0.7 % — SIGNIFICANT CHANGE UP (ref 0–6)
ESTIMATED AVERAGE GLUCOSE: 134 MG/DL — HIGH (ref 68–114)
HCT VFR BLD CALC: 50.6 % — HIGH (ref 39–50)
HGB BLD-MCNC: 16.5 G/DL — SIGNIFICANT CHANGE UP (ref 13–17)
IMM GRANULOCYTES NFR BLD AUTO: 0.4 % — SIGNIFICANT CHANGE UP (ref 0–0.9)
LYMPHOCYTES # BLD AUTO: 1.59 K/UL — SIGNIFICANT CHANGE UP (ref 1–3.3)
LYMPHOCYTES # BLD AUTO: 16.2 % — SIGNIFICANT CHANGE UP (ref 13–44)
MCHC RBC-ENTMCNC: 31.5 PG — SIGNIFICANT CHANGE UP (ref 27–34)
MCHC RBC-ENTMCNC: 32.6 GM/DL — SIGNIFICANT CHANGE UP (ref 32–36)
MCV RBC AUTO: 96.6 FL — SIGNIFICANT CHANGE UP (ref 80–100)
MONOCYTES # BLD AUTO: 1.49 K/UL — HIGH (ref 0–0.9)
MONOCYTES NFR BLD AUTO: 15.2 % — HIGH (ref 2–14)
NEUTROPHILS # BLD AUTO: 6.53 K/UL — SIGNIFICANT CHANGE UP (ref 1.8–7.4)
NEUTROPHILS NFR BLD AUTO: 66.6 % — SIGNIFICANT CHANGE UP (ref 43–77)
NT-PROBNP SERPL-SCNC: 61 PG/ML — SIGNIFICANT CHANGE UP (ref 0–300)
PLATELET # BLD AUTO: 168 K/UL — SIGNIFICANT CHANGE UP (ref 150–400)
RBC # BLD: 5.24 M/UL — SIGNIFICANT CHANGE UP (ref 4.2–5.8)
RBC # FLD: 14.3 % — SIGNIFICANT CHANGE UP (ref 10.3–14.5)
TROPONIN T, HIGH SENSITIVITY RESULT: 13 NG/L — SIGNIFICANT CHANGE UP (ref 0–51)
TROPONIN T, HIGH SENSITIVITY RESULT: 15 NG/L — SIGNIFICANT CHANGE UP (ref 0–51)
WBC # BLD: 9.81 K/UL — SIGNIFICANT CHANGE UP (ref 3.8–10.5)
WBC # FLD AUTO: 9.81 K/UL — SIGNIFICANT CHANGE UP (ref 3.8–10.5)

## 2024-02-19 PROCEDURE — 99285 EMERGENCY DEPT VISIT HI MDM: CPT

## 2024-02-19 PROCEDURE — 71046 X-RAY EXAM CHEST 2 VIEWS: CPT

## 2024-02-19 PROCEDURE — 93005 ELECTROCARDIOGRAM TRACING: CPT

## 2024-02-19 PROCEDURE — 99236 HOSP IP/OBS SAME DATE HI 85: CPT

## 2024-02-19 PROCEDURE — 93010 ELECTROCARDIOGRAM REPORT: CPT

## 2024-02-19 PROCEDURE — 96375 TX/PRO/DX INJ NEW DRUG ADDON: CPT

## 2024-02-19 PROCEDURE — 99283 EMERGENCY DEPT VISIT LOW MDM: CPT

## 2024-02-19 PROCEDURE — 85025 COMPLETE CBC W/AUTO DIFF WBC: CPT

## 2024-02-19 PROCEDURE — 87040 BLOOD CULTURE FOR BACTERIA: CPT

## 2024-02-19 PROCEDURE — 80053 COMPREHEN METABOLIC PANEL: CPT

## 2024-02-19 PROCEDURE — G0378: CPT

## 2024-02-19 PROCEDURE — 83036 HEMOGLOBIN GLYCOSYLATED A1C: CPT

## 2024-02-19 PROCEDURE — 71046 X-RAY EXAM CHEST 2 VIEWS: CPT | Mod: 26

## 2024-02-19 PROCEDURE — 96374 THER/PROPH/DIAG INJ IV PUSH: CPT

## 2024-02-19 PROCEDURE — 93306 TTE W/DOPPLER COMPLETE: CPT | Mod: 26

## 2024-02-19 PROCEDURE — 84484 ASSAY OF TROPONIN QUANT: CPT

## 2024-02-19 PROCEDURE — 36415 COLL VENOUS BLD VENIPUNCTURE: CPT

## 2024-02-19 PROCEDURE — 93970 EXTREMITY STUDY: CPT

## 2024-02-19 PROCEDURE — 96376 TX/PRO/DX INJ SAME DRUG ADON: CPT

## 2024-02-19 PROCEDURE — T1013: CPT

## 2024-02-19 PROCEDURE — C8929: CPT

## 2024-02-19 PROCEDURE — 83880 ASSAY OF NATRIURETIC PEPTIDE: CPT

## 2024-02-19 RX ORDER — FUROSEMIDE 40 MG
40 TABLET ORAL ONCE
Refills: 0 | Status: COMPLETED | OUTPATIENT
Start: 2024-02-19 | End: 2024-02-19

## 2024-02-19 RX ADMIN — Medication 2000 MILLIGRAM(S): at 14:00

## 2024-02-19 RX ADMIN — Medication 2000 MILLIGRAM(S): at 06:32

## 2024-02-19 RX ADMIN — Medication 40 MILLIGRAM(S): at 06:32

## 2024-02-19 NOTE — ED CDU PROVIDER DISPOSITION NOTE - CLINICAL COURSE
44M with pmh pre-DM presenting with left LE cellulitis. US deplex neg. Placed in obs for IV abx. Pt now feeling improved. Decreased erythema, edema and pain. Afebrile and no leukocytosis. A1c 6.4, discussed results with pt.

## 2024-02-19 NOTE — ED CDU PROVIDER INITIAL DAY NOTE - PHYSICAL EXAMINATION
Physical Examination:   Gen: NAD, AOx3  Head: NCAT  HEENT: EOMI, oral mucosa moist, normal conjunctiva, neck supple  Lung: no respiratory distress  CV:  Normal perfusion  Abd: soft, NT ND  MSK: no visible deformities + b/l edema pitting, + redness warmth to LLE > Right   Neuro: No focal neurologic deficits  Skin: No rash   Psych: normal affect

## 2024-02-19 NOTE — CHART NOTE - NSCHARTNOTEFT_GEN_A_CORE
Cardiology consulted to r/o CHF discussed w/ [place]. There is no evidence of CHF. pBNP is normal, troponin is normal, CXR has no evidence of heart failure. Patient presented with unilateral leg pain associated w/ fever, redness, warmth, and subjective fever. YANELY San will evaluate patient and call us if necessary, for now consult is rescinded.

## 2024-02-19 NOTE — ED CDU PROVIDER INITIAL DAY NOTE - ATTENDING APP SHARED VISIT CONTRIBUTION OF CARE
I have personally performed a history and physical examination of the patient and discussed management with the MURPHY as well as the patient.  I reviewed the MURPHY's note and agree with the documented findings and plan of care.  I have authored and modified critical sections of the Provider Note, including but not limited to HPI, Physical Exam and MDM.    44 year old male with no med hx presented to ED c/o b/l leg swelling and left leg pain. Pt states always has LE swelling x 2 years, worse after standing long hours which he does daily for work. Over the last couple days admits to increased tenderness, redness and warmth to left leg. denies injury/trauma, sob, chest pain. never followed with cardiology, never been on diuretics US doppler b/l LE's negative tor dvt. labs WNL, trops 15 & 13, ekg without ischemic changes, xray chest without signs of fluid overload. Placed in observation for evaluation by cardiology.

## 2024-02-19 NOTE — CONSULT NOTE ADULT - PROBLEM SELECTOR RECOMMENDATION 9
- Pt presented w/ cellulitis  - consult initially canceled and then we were recalled for concern for hypoxia   - There is no evidence of CHF. pBNP is normal, troponin is normal, CXR has no evidence of heart failure. Patient presented with unilateral leg pain associated w/ fever, redness, warmth, and subjective fever.   - There is no pitting LE edema on physical exam, no JVD, lungs are clear. Pt has significant obesity with marked abdominal girth. Pt is also able to lay flat in stretcher without SOB.   - He states he can walk around and go up and down stairs without issue, he also stands for majority of the day for work and has no issues.   - Hypoxia is of non-cardiac etiology, hypoxia during hours of sleep, pt has sleep apnea and obesity hypoventilation and has periods of apnea during sleep. Documented hypoxia episodes were at 1 AM and 4AM which correlates during hours of sleep.   - There is absence of any evidence to suggest any heart failure/cardiac etiologies, any hypoxia is of a non cardiac etiology. No need for further workup.  - Treat underlying cellulitis  - needs to lose significant amount of weight, Diet/lifestyle modifications and medication compliance heavily reinforced   - pt needs outpatient sleep study and a script for a CPAP machine  - early onset metabolic syndrome, should get ozempic 0.25mg once weekly and have close follow up with a weight loss specialist and primary care  - no further inpatient cardiology workup or recommendations, we will sign off.

## 2024-02-19 NOTE — ED CDU PROVIDER DISPOSITION NOTE - NSFOLLOWUPINSTRUCTIONS_ED_ALL_ED_FT
Follow up with PCP and pulmonology.   Continue antibiotics as prescribed.    Cellulitis    Cellulitis is a skin infection caused by bacteria. This condition occurs most often in the arms and lower legs but can occur anywhere over the body. Symptoms include redness, swelling, warm skin, tenderness, and chills/fever. If you were prescribed an antibiotic medicine, take it as told by your health care provider. Do not stop taking the antibiotic even if you start to feel better.    SEEK IMMEDIATE MEDICAL CARE IF YOU HAVE ANY OF THE FOLLOWING SYMPTOMS: worsening fever, red streaks coming from affected area, vomiting or diarrhea, or dizziness/lightheadedness.

## 2024-02-19 NOTE — ED CDU PROVIDER INITIAL DAY NOTE - OBJECTIVE STATEMENT
44 year old male with no med hx presented to ED c/o b/l leg swelling and left leg pain. 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 denies injury/trauma, sob, chest pain. never followed with cardiology, never been on diuretics US doppler b/l LE's negative tor dvt. labs WNL, trops 15 & 13, ekg without ischemic changes, xray chest without signs of fluid overload

## 2024-02-19 NOTE — ED CDU PROVIDER DISPOSITION NOTE - PATIENT PORTAL LINK FT
no You can access the FollowMyHealth Patient Portal offered by [facility] by registering at the following website: [email or web address]. By joining [employer]’s FollowMyHealth portal, you will also be able to view your health information using other applications (apps) compatible with our system.

## 2024-02-19 NOTE — CONSULT NOTE ADULT - NS ATTEND AMEND GEN_ALL_CORE FT
43 y/o obese M p/w LLE pain and swelling, likely cellulitis. Exam not suggestive of CHF. TTE unremarkable. US negative for DVT. Would recommend outpatient sleep study for possible obesity hypoventilation syndrome. Weight loss encouraged.

## 2024-02-19 NOTE — ED ADULT NURSE REASSESSMENT NOTE - NS ED NURSE REASSESS COMMENT FT1
Patient A&Ox4, resting on stretcher in NAD, remains on cardiac monitor with no events reported.  Pt to be placed in observation, currently waiting for room.  Safety maintained with bed locked, in lowest position, call bell within reach.
Patient AA&Ox4, resp even/unlabored, ambulatory, steady gait noted, discharged home with all belongings. Med rec and discharge instructions explained and given to pt by YANELY San. Patient verbalizes understanding on physician follow up, medication regimen and next dose, s/s of complications and monitoring. No distress noted. VSS, recorded in EMR. Peripheral IV removed, intact, bleeding controlled.
Assumed care of patient at 11:15 from ANNABEL Kauffman. Charting as noted. Patient AA&Ox4, resp even/unlabored, MAEx4, NAD, presents to ED c/o LLE swelling and redness. At time of assessment, patient denies pain/discomfort, denies CP/SOB. Patient updated on the plan of care, awaiting further management of care . Stretcher locked in lowest position, IV site flushed w/ NS. No redness, swelling or pain noted to site. No signs of acute distress noted, safety maintained, spouse at bed side.

## 2024-02-19 NOTE — CONSULT NOTE ADULT - ASSESSMENT
This is a 44-year-old male Class III Obesity, former smoker, former ETOH abuse (both >10 years ago), pre-diabetic, Stage 1 HTN, MANUEL/OHS who presents w/ left lower extremity pain redness and warmth times the last 3 days. Patient admits to shaking chills with tactile fever but did not take his temperature.  Patient denies any associated chest pain, shortness of breath, abdominal pain, nausea, vomiting or syncope. patient has been taking Tylenol without relief of pain or fever.

## 2024-02-19 NOTE — CONSULT NOTE ADULT - CONSTITUTIONAL
laying flat, breathing comfortably. able to move around without dyspnea/well-groomed/no distress/obese

## 2024-02-19 NOTE — ED CDU PROVIDER INITIAL DAY NOTE - PROGRESS NOTE DETAILS
Pt evaluated this morning with Dr Waterman. continuing cefazolin for LLE cellulitis. TTE and cardiology eval pending. Guevara [id number]-old male with left lower extremity cellulitis placed in observation unit for IV antibiotics.  Patient had recent weight gain and has been having episodes of hypoxia when he goes to sleep.  Patient is prediabetic to his knowledge and had no recent travel.  Patient also reports left leg pain    Patient is alert obese appearing male, S1-S2 normal regular, bilateral clear breath sounds abdomen soft nontender, left lower extremity is red and swollen with pitting edema, neuroexam is alert oriented x 3, skin warm dry moderate turgor    Patient likely has untreated sleep apnea and potential diastolic heart failure due to recent weight gain we will treat him with IV antibiotics for cellulitis and call cardio consult and do an echo

## 2024-02-19 NOTE — CONSULT NOTE ADULT - SUBJECTIVE AND OBJECTIVE BOX
[facility]                                              CARDIOLOGY AT [address], [place] [address]                                             Telephone: [phone]. Fax:[phone]                                                       CARDIOLOGY CONSULTATION NOTE                                                                                             History obtained by: Patient and medical record   Community Cardiologist: None    obtained: Yes [ x ] No [  ]  Reason for Consultation: R/O CHF   Available out pt records reviewed: Yes [  ] No [  ]    Chief complaint:    Patient is a 44y old  Male who presents with a chief complaint of cellulitis    HPI:  This is a 44-year-old male Class III Obesity, former smoker, former ETOH abuse (both >10 years ago), pre-diabetic, Stage 1 HTN, MANUEL/OHS who presents w/ left lower extremity pain redness and warmth times the last 3 days. Patient admits to shaking chills with tactile fever but did not take his temperature.  Patient denies any associated chest pain, shortness of breath, abdominal pain, nausea, vomiting or syncope. patient has been taking Tylenol without relief of pain or fever.     PAST MEDICAL HISTORY  No pertinent past medical history    PAST SURGICAL HISTORY  No significant past surgical history    SUBSTANCE USE HISTORY  Denies current and previous substance use [  ]   CIGARETTES -   ALCOHOL -   DRUGS -     FAMILY HISTORY:      CARDIAC SPECIFIC FAMILY HX   No KNOWN family history of Cardiovascular disease, CAD, or sudden death in first degree relatives unless specified below  Family History of Cardiovascular Disease:  [  ]   Coronary Artery Disease in first degree relative:  [  ]   Sudden Cardiac Death in First degree relative: [  ]    HOME MEDICATIONS:      CURRENT CARDIAC MEDICATIONS:      CURRENT OTHER MEDICATIONS:  ceFAZolin  Injectable. 2000 milliGRAM(s) IV Push every 8 hours      ALLERGIES:   No Known Allergies      VITAL SIGNS:  T(C): 37.1 (24 @ 23:26), Max: 37.2 (24 @ 19:38)  T(F): 98.8 (24 @ 23:26), Max: 98.9 (24 @ 19:38)  HR: 93 (24 @ 06:21) (93 - 109)  BP: 114/70 (24 @ 06:21) (100/66 - 155/91)  RR: 18 (24 @ 06:21) (18 - 20)  SpO2: 98% (24 @ 06:21) (94% - 98%)    INTAKE AND OUTPUT:       LABS:                            16.5   9.81  )-----------( 168      ( 2024 06:00 )             50.6         138  |  98  |  9.0  ----------------------------<  116<H>  4.5   |  27.0  |  0.78    Ca    8.9      2024 20:55    TPro  7.1  /  Alb  3.5  /  TBili  0.4  /  DBili  x   /  AST  36  /  ALT  30  /  AlkPhos  64        Urinalysis Basic - ( 2024 20:55 )    Color: x / Appearance: x / SG: x / pH: x  Gluc: 116 mg/dL / Ketone: x  / Bili: x / Urobili: x   Blood: x / Protein: x / Nitrite: x   Leuk Esterase: x / RBC: x / WBC x   Sq Epi: x / Non Sq Epi: x / Bacteria: x      RADIOLOGY IMAGIN Lead ECG:   Provider's Contact #: [phone] (24 @ 04:52) [Completed]  Xray Chest 2 Views PA/Lat: Urgent   Indication: routine  Transport: Stretcher-Crib  Exam Completed (24 @ 23:46) [Results Available]

## 2024-02-19 NOTE — ED CDU PROVIDER INITIAL DAY NOTE - CLINICAL SUMMARY MEDICAL DECISION MAKING FREE TEXT BOX
44 year old male with no med hx presented to ED c/o b/l leg swelling and left leg pain.  never been on diuretics US doppler b/l LE's negative tor dvt. labs WNL, trops 15 & 13, ekg without ischemic changes, xray chest without signs of fluid overload  - IV abx   - Cards

## 2024-02-19 NOTE — ED CDU PROVIDER DISPOSITION NOTE - CARE PROVIDER_API CALL
Luis Enrique Mitchell  Critical Care Medicine  [address] [place] [id number]  Phone: [phone]  Fax: [phone]  Follow Up Time:

## 2024-02-19 NOTE — CONSULT NOTE ADULT - CARDIOVASCULAR
normal/regular rate and rhythm/S1 S2 present/no gallops/no rub/no murmur/no JVD/no pedal edema negative

## 2024-02-24 LAB
CULTURE RESULTS: SIGNIFICANT CHANGE UP
SPECIMEN SOURCE: SIGNIFICANT CHANGE UP

## 2025-05-20 ENCOUNTER — RX ONLY (RX ONLY)
Age: 45
End: 2025-05-20

## 2025-05-20 ENCOUNTER — OFFICE (OUTPATIENT)
Dept: URBAN - METROPOLITAN AREA CLINIC 94 | Facility: CLINIC | Age: 45
Setting detail: OPHTHALMOLOGY
End: 2025-05-20
Payer: COMMERCIAL

## 2025-05-20 DIAGNOSIS — H11.32: ICD-10-CM

## 2025-05-20 PROCEDURE — 92012 INTRM OPH EXAM EST PATIENT: CPT | Performed by: OPHTHALMOLOGY

## 2025-05-20 ASSESSMENT — KERATOMETRY
OS_K2POWER_DIOPTERS: 43.50
OD_K1POWER_DIOPTERS: 41.75
OS_K1POWER_DIOPTERS: 42.25
OD_K2POWER_DIOPTERS: 42.25
OD_AXISANGLE_DEGREES: 105
OS_AXISANGLE_DEGREES: 095

## 2025-05-20 ASSESSMENT — VISUAL ACUITY
OD_BCVA: 20/20
OS_BCVA: 20/20

## 2025-05-20 ASSESSMENT — CONFRONTATIONAL VISUAL FIELD TEST (CVF)
OD_FINDINGS: FULL
OS_FINDINGS: FULL

## 2025-05-20 ASSESSMENT — REFRACTION_AUTOREFRACTION
OS_SPHERE: -0.25
OS_AXIS: 095
OD_AXIS: 070
OS_CYLINDER: -1.25
OD_SPHERE: 0.00
OD_CYLINDER: -0.25

## 2025-05-20 ASSESSMENT — TONOMETRY
OS_IOP_MMHG: 20
OD_IOP_MMHG: 19

## 2025-06-17 ENCOUNTER — OFFICE (OUTPATIENT)
Dept: URBAN - METROPOLITAN AREA CLINIC 94 | Facility: CLINIC | Age: 45
Setting detail: OPHTHALMOLOGY
End: 2025-06-17
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
## 2025-06-17 PROCEDURE — NO SHOW FE NO SHOW FEE: Performed by: OPHTHALMOLOGY

## 2025-06-26 ENCOUNTER — OFFICE (OUTPATIENT)
Dept: URBAN - METROPOLITAN AREA CLINIC 94 | Facility: CLINIC | Age: 45
Setting detail: OPHTHALMOLOGY
End: 2025-06-26
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
## 2025-06-26 PROCEDURE — NO SHOW FE NO SHOW FEE: Performed by: OPTOMETRIST
